# Patient Record
Sex: FEMALE | URBAN - METROPOLITAN AREA
[De-identification: names, ages, dates, MRNs, and addresses within clinical notes are randomized per-mention and may not be internally consistent; named-entity substitution may affect disease eponyms.]

---

## 2022-06-08 ENCOUNTER — OFFICE VISIT (OUTPATIENT)
Dept: URBAN - METROPOLITAN AREA CLINIC 4 | Facility: CLINIC | Age: 32
End: 2022-06-08

## 2022-06-08 DIAGNOSIS — H53.8 OTHER VISUAL DISTURBANCES: Primary | ICD-10-CM

## 2022-06-08 ASSESSMENT — INTRAOCULAR PRESSURE
OS: 17
OD: 15

## 2022-06-08 ASSESSMENT — KERATOMETRY
OS: 43.25
OD: 43.00

## 2022-06-08 NOTE — IMPRESSION/PLAN
Impression: Other visual disturbances: H53.8. Plan: Laser vision correction not indicated due to Rx beign to large. Recommend to continue using glasses/contact lenses and to F/U yearly, or to consider ICL.